# Patient Record
Sex: FEMALE | Race: WHITE | NOT HISPANIC OR LATINO | URBAN - NONMETROPOLITAN AREA
[De-identification: names, ages, dates, MRNs, and addresses within clinical notes are randomized per-mention and may not be internally consistent; named-entity substitution may affect disease eponyms.]

---

## 2022-11-02 ENCOUNTER — OFFICE VISIT (OUTPATIENT)
Dept: URBAN - NONMETROPOLITAN AREA CLINIC 2 | Facility: CLINIC | Age: 62
End: 2022-11-02
Payer: MEDICARE

## 2022-11-02 ENCOUNTER — DASHBOARD ENCOUNTERS (OUTPATIENT)
Age: 62
End: 2022-11-02

## 2022-11-02 ENCOUNTER — WEB ENCOUNTER (OUTPATIENT)
Dept: URBAN - NONMETROPOLITAN AREA CLINIC 2 | Facility: CLINIC | Age: 62
End: 2022-11-02

## 2022-11-02 VITALS
DIASTOLIC BLOOD PRESSURE: 87 MMHG | HEART RATE: 85 BPM | TEMPERATURE: 97.2 F | BODY MASS INDEX: 35.17 KG/M2 | SYSTOLIC BLOOD PRESSURE: 135 MMHG | WEIGHT: 206 LBS | HEIGHT: 64 IN

## 2022-11-02 DIAGNOSIS — Z12.11 SCREENING FOR MALIGNANT NEOPLASM OF COLON: ICD-10-CM

## 2022-11-02 DIAGNOSIS — R19.5 LOOSE STOOLS: ICD-10-CM

## 2022-11-02 DIAGNOSIS — K76.0 FATTY LIVER: ICD-10-CM

## 2022-11-02 PROCEDURE — 99204 OFFICE O/P NEW MOD 45 MIN: CPT | Performed by: INTERNAL MEDICINE

## 2022-11-02 RX ORDER — LISINOPRIL 5 MG/1
TABLET ORAL
Qty: 90 TABLET | Status: ACTIVE | COMMUNITY

## 2022-11-02 RX ORDER — GLIPIZIDE 10 MG/1
TABLET ORAL
Qty: 270 TABLET | Status: ACTIVE | COMMUNITY

## 2022-11-02 RX ORDER — HYOSCYAMINE SULFATE 0.12 MG/1
1 TABLET UNDER THE TONGUE AND ALLOW TO DISSOLVE  AS NEEDED TABLET, ORALLY DISINTEGRATING ORAL TID
Qty: 42 TABLET | Refills: 6 | OUTPATIENT
Start: 2022-11-02 | End: 2023-02-07

## 2022-11-02 RX ORDER — GABAPENTIN 300 MG/1
CAPSULE ORAL
Qty: 90 CAPSULE | Status: ACTIVE | COMMUNITY

## 2022-11-02 RX ORDER — METFORMIN ER 500 MG 500 MG/1
TABLET ORAL
Qty: 180 TABLET | Status: ACTIVE | COMMUNITY

## 2022-11-02 RX ORDER — FLUOXETINE HYDROCHLORIDE 10 MG/1
CAPSULE ORAL
Qty: 90 CAPSULE | Status: ACTIVE | COMMUNITY

## 2022-11-02 NOTE — HPI-TODAY'S VISIT:
Ms. Lorena Mcgowan is a pleasant 63 y/o F previously followed by our clinic in 2013 for fatty liver and screening colonoscopy who presents for screening colonoscopy.  She reports she still has fatty liver.  She is followed by her primary care physician for this.  She used to have RUQ pain but this has resolved now.  She was treated for H. pylori in the past.  She describes some lactose intolerance and abdominal cramping intermittently.  Stools are loose, worse since a urinary incontinence stimulator was placed in May.

## 2022-11-03 LAB
A/G RATIO: 1.7
ABSOLUTE BASOPHILS: 68
ABSOLUTE EOSINOPHILS: 162
ABSOLUTE LYMPHOCYTES: 2924
ABSOLUTE MONOCYTES: 723
ABSOLUTE NEUTROPHILS: 4624
ALBUMIN: 4.5
ALKALINE PHOSPHATASE: 123
ALT (SGPT): 116
AST (SGOT): 54
BASOPHILS: 0.8
BILIRUBIN, TOTAL: 0.5
BUN/CREATININE RATIO: (no result)
BUN: 13
CALCIUM: 9.4
CARBON DIOXIDE, TOTAL: 28
CHLORIDE: 100
CREATININE: 0.71
EGFR: 95
EOSINOPHILS: 1.9
GLOBULIN, TOTAL: 2.7
GLUCOSE: 234
HEMATOCRIT: 43.3
HEMOGLOBIN: 14.5
LYMPHOCYTES: 34.4
MCH: 29.2
MCHC: 33.5
MCV: 87.1
MONOCYTES: 8.5
MPV: 10.7
NEUTROPHILS: 54.4
PLATELET COUNT: 269
POTASSIUM: 4.6
PROTEIN, TOTAL: 7.2
RDW: 12.8
RED BLOOD CELL COUNT: 4.97
SODIUM: 136
WHITE BLOOD CELL COUNT: 8.5

## 2022-11-22 ENCOUNTER — OFFICE VISIT (OUTPATIENT)
Dept: URBAN - NONMETROPOLITAN AREA SURGERY CENTER 1 | Facility: SURGERY CENTER | Age: 62
End: 2022-11-22

## 2022-11-22 ENCOUNTER — CLAIMS CREATED FROM THE CLAIM WINDOW (OUTPATIENT)
Dept: URBAN - NONMETROPOLITAN AREA SURGERY CENTER 1 | Facility: SURGERY CENTER | Age: 62
End: 2022-11-22
Payer: MEDICARE

## 2022-11-22 DIAGNOSIS — Z12.11 COLON CANCER SCREENING: ICD-10-CM

## 2022-11-22 PROCEDURE — G8907 PT DOC NO EVENTS ON DISCHARG: HCPCS | Performed by: INTERNAL MEDICINE

## 2022-11-22 PROCEDURE — G0121 COLON CA SCRN NOT HI RSK IND: HCPCS | Performed by: INTERNAL MEDICINE

## 2022-11-22 RX ORDER — LISINOPRIL 5 MG/1
TABLET ORAL
Qty: 90 TABLET | Status: ACTIVE | COMMUNITY

## 2022-11-22 RX ORDER — METFORMIN ER 500 MG 500 MG/1
TABLET ORAL
Qty: 180 TABLET | Status: ACTIVE | COMMUNITY

## 2022-11-22 RX ORDER — GABAPENTIN 300 MG/1
CAPSULE ORAL
Qty: 90 CAPSULE | Status: ACTIVE | COMMUNITY

## 2022-11-22 RX ORDER — HYOSCYAMINE SULFATE 0.12 MG/1
1 TABLET UNDER THE TONGUE AND ALLOW TO DISSOLVE  AS NEEDED TABLET, ORALLY DISINTEGRATING ORAL TID
Qty: 42 TABLET | Refills: 6 | Status: ACTIVE | COMMUNITY
Start: 2022-11-02 | End: 2023-02-07

## 2022-11-22 RX ORDER — GLIPIZIDE 10 MG/1
TABLET ORAL
Qty: 270 TABLET | Status: ACTIVE | COMMUNITY

## 2022-11-22 RX ORDER — FLUOXETINE HYDROCHLORIDE 10 MG/1
CAPSULE ORAL
Qty: 90 CAPSULE | Status: ACTIVE | COMMUNITY

## 2023-03-02 ENCOUNTER — OFFICE VISIT (OUTPATIENT)
Dept: URBAN - NONMETROPOLITAN AREA CLINIC 2 | Facility: CLINIC | Age: 63
End: 2023-03-02

## 2024-09-19 ENCOUNTER — LAB OUTSIDE AN ENCOUNTER (OUTPATIENT)
Dept: URBAN - NONMETROPOLITAN AREA CLINIC 2 | Facility: CLINIC | Age: 64
End: 2024-09-19

## 2024-09-19 ENCOUNTER — OFFICE VISIT (OUTPATIENT)
Dept: URBAN - NONMETROPOLITAN AREA CLINIC 2 | Facility: CLINIC | Age: 64
End: 2024-09-19
Payer: MEDICARE

## 2024-09-19 VITALS
HEART RATE: 92 BPM | DIASTOLIC BLOOD PRESSURE: 78 MMHG | SYSTOLIC BLOOD PRESSURE: 140 MMHG | TEMPERATURE: 98 F | HEIGHT: 64 IN | WEIGHT: 215 LBS | BODY MASS INDEX: 36.7 KG/M2

## 2024-09-19 DIAGNOSIS — K76.0 FATTY LIVER DISEASE, NONALCOHOLIC: ICD-10-CM

## 2024-09-19 DIAGNOSIS — Z12.11 COLON CANCER SCREENING: ICD-10-CM

## 2024-09-19 PROCEDURE — 99244 OFF/OP CNSLTJ NEW/EST MOD 40: CPT | Performed by: NURSE PRACTITIONER

## 2024-09-19 PROCEDURE — 99214 OFFICE O/P EST MOD 30 MIN: CPT | Performed by: NURSE PRACTITIONER

## 2024-09-19 RX ORDER — FLUOXETINE HYDROCHLORIDE 10 MG/1
CAPSULE ORAL
Qty: 90 CAPSULE | Status: ACTIVE | COMMUNITY

## 2024-09-19 RX ORDER — LISINOPRIL 5 MG/1
TABLET ORAL
Qty: 90 TABLET | Status: ACTIVE | COMMUNITY

## 2024-09-19 RX ORDER — OMEPRAZOLE 20 MG/1
1 CAPSULE 30 MINUTES BEFORE MORNING MEAL CAPSULE, DELAYED RELEASE ORAL ONCE A DAY
Status: ACTIVE | COMMUNITY

## 2024-09-19 RX ORDER — GLIPIZIDE 10 MG/1
TABLET ORAL
Qty: 270 TABLET | Status: ACTIVE | COMMUNITY

## 2024-09-19 RX ORDER — PREGABALIN 50 MG/1
1 CAPSULE CAPSULE ORAL
Status: ACTIVE | COMMUNITY

## 2024-09-19 RX ORDER — CELECOXIB 50 MG/1
1 CAPSULE CAPSULE ORAL TWICE A DAY
Status: ACTIVE | COMMUNITY

## 2024-09-19 RX ORDER — INSULIN HUMAN 100 [IU]/ML
AS DIRECTED INJECTION, SUSPENSION SUBCUTANEOUS
Status: ACTIVE | COMMUNITY

## 2024-09-19 NOTE — HPI-TODAY'S VISIT:
Roslyn is a very pleasant 64-year-old female who we have been asked to consult on by for elevated LFTs by Dr Wild Hinojosa.  A copy of note along with recommendations will be sent to referring provider.  She is currently without GI complaints.  She had some AST ALT elevation with PCP.  She has been told she had fatty liver disease in the past.  She does have a borderline hemoglobin A1c.  She does taken a lot of fiber.  She does report she has gained about 10 to 15 pounds in recent months and is working on getting this off.  Last colonoscopy in 2022 within normal limits. Sb

## 2024-09-19 NOTE — PHYSICAL EXAM EYES:
Conjuntivae and eyelids appear normal,  Sclerae : White without injection  Prior to admission pt reports being independent of all ADL's & functional mobility without AD. Pt resides in house with spouse, 3 steps to enter, 19 steps total to bedroom. (+) tub, with shower chair and grab bar. +driving. +glasses for distance and reading.

## 2024-09-24 LAB
A/G RATIO: 1.8
ABSOLUTE BASOPHILS: 63
ABSOLUTE EOSINOPHILS: 198
ABSOLUTE LYMPHOCYTES: 2678
ABSOLUTE MONOCYTES: 553
ABSOLUTE NEUTROPHILS: 4408
ACTIN (SMOOTH MUSCLE) ANTIBODY (IGG): <20
AFP, SERUM, TUMOR MARKER: 2.7
ALBUMIN: 4.4
ALKALINE PHOSPHATASE: 108
ALT (SGPT): 56
ANA SCREEN, IFA: NEGATIVE
AST (SGOT): 28
BASOPHILS: 0.8
BILIRUBIN, TOTAL: 0.4
BUN/CREATININE RATIO: (no result)
BUN: 11
CALCIUM: 9.4
CARBON DIOXIDE, TOTAL: 27
CHLORIDE: 101
CREATININE: 0.59
EGFR: 100
EOSINOPHILS: 2.5
GGT: 26
GLOBULIN, TOTAL: 2.4
GLUCOSE: 244
HBSAG SCREEN: (no result)
HEMATOCRIT: 41.6
HEMOGLOBIN: 13.7
HEP A AB, IGM: (no result)
HEP B CORE AB, IGM: (no result)
HEPATITIS C ANTIBODY: (no result)
IMMUNOGLOBULIN A: 171
INR: 1
INTERPRETATION: (no result)
IRON BIND.CAP.(TIBC): 343
IRON SATURATION: 21
IRON: 73
LYMPHOCYTES: 33.9
MCH: 29.7
MCHC: 32.9
MCV: 90.2
MITOCHONDRIAL (M2) ANTIBODY: <=20
MONOCYTES: 7
MPV: 10.7
NEUTROPHILS: 55.8
PLATELET COUNT: 229
POTASSIUM: 4.1
PROTEIN, TOTAL: 6.8
PT: 10.4
RDW: 12.4
RED BLOOD CELL COUNT: 4.61
RHEUMATOID FACTOR: 10
SJOGREN'S ANTIBODY (SS-A): (no result)
SJOGREN'S ANTIBODY (SS-B): (no result)
SODIUM: 139
TISSUE TRANSGLUTAMINASE AB, IGA: <1
WHITE BLOOD CELL COUNT: 7.9

## 2024-09-27 ENCOUNTER — TELEPHONE ENCOUNTER (OUTPATIENT)
Dept: URBAN - NONMETROPOLITAN AREA CLINIC 2 | Facility: CLINIC | Age: 64
End: 2024-09-27

## 2024-11-14 ENCOUNTER — OFFICE VISIT (OUTPATIENT)
Dept: URBAN - NONMETROPOLITAN AREA CLINIC 2 | Facility: CLINIC | Age: 64
End: 2024-11-14

## 2025-03-19 ENCOUNTER — OFFICE VISIT (OUTPATIENT)
Dept: URBAN - NONMETROPOLITAN AREA CLINIC 2 | Facility: CLINIC | Age: 65
End: 2025-03-19